# Patient Record
Sex: FEMALE | Race: WHITE
[De-identification: names, ages, dates, MRNs, and addresses within clinical notes are randomized per-mention and may not be internally consistent; named-entity substitution may affect disease eponyms.]

---

## 2019-10-11 ENCOUNTER — HOSPITAL ENCOUNTER (EMERGENCY)
Dept: HOSPITAL 25 - UCCORT | Age: 24
Discharge: HOME | End: 2019-10-11
Payer: COMMERCIAL

## 2019-10-11 VITALS — SYSTOLIC BLOOD PRESSURE: 118 MMHG | DIASTOLIC BLOOD PRESSURE: 82 MMHG

## 2019-10-11 DIAGNOSIS — Z91.09: ICD-10-CM

## 2019-10-11 DIAGNOSIS — N39.0: Primary | ICD-10-CM

## 2019-10-11 PROCEDURE — 99212 OFFICE O/P EST SF 10 MIN: CPT

## 2019-10-11 PROCEDURE — 87086 URINE CULTURE/COLONY COUNT: CPT

## 2019-10-11 PROCEDURE — G0463 HOSPITAL OUTPT CLINIC VISIT: HCPCS

## 2019-10-11 PROCEDURE — 87077 CULTURE AEROBIC IDENTIFY: CPT

## 2019-10-11 PROCEDURE — 81003 URINALYSIS AUTO W/O SCOPE: CPT

## 2019-10-11 NOTE — UC
Complaint Female HPI





- HPI Summary


HPI Summary: 





3 day hx of dysuria and frequency, tends not to void regularly. Not concerned 

about STI's at this time. 





- History Of Current Complaint


Stated Complaint: URINARY COMPLAINT


Time Seen by Provider: 10/11/19 09:10


Hx Obtained From: Patient


Hx Last Menstrual Period: 7/2/16


Onset/Duration: Gradual Onset, Lasting Days - 3


Timing: Constant


Severity Initially: Mild


Severity Currently: Moderate


Character: Burning


Aggravating Factor(s): Urination


Alleviating Factor(s): Meds - using otc cranberry tablets.


Associated Signs And Symptoms: Negative: Fever, Back Pain, Vaginal Bleeding/

Discharge





- Risk Factors


Ectopic Pregnancy Risk Factor: Negative


Ovarian Torsion Risk Factor: Negative





- Allergies/Home Medications


Allergies/Adverse Reactions: 


 Allergies











Allergy/AdvReac Type Severity Reaction Status Date / Time


 


seasonal Allergy  Congestion Uncoded 10/11/19 09:11











Home Medications: 


 Home Medications





Eth Estradiol/Drospirenone(NF) [Breonna 28(NF)] 1 tab PO DAILY 10/11/19 [History 

Confirmed 10/11/19]


Phenazopyridine HCl [Urinary Pain Relief] 97.5 mg PO Q8H PRN 10/11/19 [History 

Confirmed 10/11/19]











PMH/Surg Hx/FS Hx/Imm Hx


Previously Healthy: Yes





- Surgical History


Surgical History: Yes


Surgery Procedure, Year, and Place: Dental surgery





- Family History


Known Family History: Positive: Non-Contributory





- Social History


Occupation: Student - nursing student.


Lives: With Family


Alcohol Use: Occasionally


Substance Use Type: None


Smoking Status (MU): Never Smoked Tobacco





Review of Systems


All Other Systems Reviewed And Are Negative: Yes


Constitutional: Positive: Negative


Skin: Positive: Negative


Respiratory: Positive: Negative


Cardiovascular: Positive: Negative


Gastrointestinal: Positive: Negative


Genitourinary: Positive: Dysuria, Frequency, Urgency


Motor: Positive: Negative


Neurovascular: Positive: Negative


Musculoskeletal: Positive: Negative


Neurological: Positive: Negative


Psychological: Positive: Negative





Physical Exam


Triage Information Reviewed: Yes


Appearance: Well-Appearing, No Pain Distress


ENT: Positive: Normal ENT inspection


Neck: Positive: Supple, Nontender, No Lymphadenopathy


Respiratory: Positive: Lungs clear, Normal breath sounds


Cardiovascular: Positive: RRR, No Murmur


Abdomen Description: Positive: Nontender, No Organomegaly, Soft.  Negative: CVA 

Tenderness (R), CVA Tenderness (L)


Neurological Exam: Normal


Psychological Exam: Normal


Skin Exam: Normal





Diagnostics





- Laboratory


Lab Results: 





UA with nitrites, wbc, protein





 Complaint Female Dx





- Course


Course Of Treatment: 





macrobid for treatment of UTI. Culture urine. 





- Differential Dx/Diagnosis


Differential Diagnosis/HQI/PQRI: Sexually Transmitted Disease, Urinary Tract 

Infection


Provider Diagnosis: 


 UTI (urinary tract infection)








Discharge ED





- Sign-Out/Discharge


Documenting (check all that apply): Patient Departure


All imaging exams completed and their final reports reviewed: No Studies





- Discharge Plan


Condition: Stable


Disposition: HOME


Prescriptions: 


Nitrofurantoin Monohyd/M-Cryst [Macrobid 100 mg Capsule] 100 mg PO BID #14 cap


Patient Education Materials:  Urinary Tract Infection in Women (ED)


Referrals: 


Jay Almaraz MD [Primary Care Provider] - 


Additional Instructions: 


Urine culture will be sent, and you will be notified if a change of antibiotics 

is needed. 





- Billing Disposition and Condition


Condition: STABLE


Disposition: Home